# Patient Record
Sex: MALE | Race: WHITE
[De-identification: names, ages, dates, MRNs, and addresses within clinical notes are randomized per-mention and may not be internally consistent; named-entity substitution may affect disease eponyms.]

---

## 2019-04-06 ENCOUNTER — HOSPITAL ENCOUNTER (EMERGENCY)
Dept: HOSPITAL 25 - UCCORT | Age: 54
Discharge: HOME | End: 2019-04-06
Payer: COMMERCIAL

## 2019-04-06 VITALS — SYSTOLIC BLOOD PRESSURE: 103 MMHG | DIASTOLIC BLOOD PRESSURE: 71 MMHG

## 2019-04-06 DIAGNOSIS — J32.9: Primary | ICD-10-CM

## 2019-04-06 PROCEDURE — G0463 HOSPITAL OUTPT CLINIC VISIT: HCPCS

## 2019-04-06 PROCEDURE — 99202 OFFICE O/P NEW SF 15 MIN: CPT

## 2019-04-06 NOTE — UC
Respiratory Complaint HPI





- HPI Summary


HPI Summary: 


Per RN triage: "Sinus congestion and pressure for 1 week. Worsening over the 

last 4 days. Starting to move towards chest. Yellow productive cough. "


+ teeth hurt. increased painw/ beding fwd. has ahd sinus infections  in past 

and sx are consistent. 


-no wheezing. no asthma. 





- History of Current Complaint


Chief Complaint: UCGeneralIllness


Stated Complaint: SOB,SINUS COMPLAINT


Time Seen by Provider: 04/06/19 12:06


Pain Intensity: 4





- Allergies/Home Medications


Allergies/Adverse Reactions: 


 Allergies











Allergy/AdvReac Type Severity Reaction Status Date / Time


 


No Known Allergies Allergy   Verified 04/06/19 11:32











Home Medications: 


 Home Medications





Otc Decongestant 1 dose PO ONCE PRN 04/06/19 [History Confirmed 04/06/19]











PMH/Surg Hx/FS Hx/Imm Hx


Previously Healthy: Yes





- Surgical History


Surgical History: Yes


Surgery Procedure, Year, and Place: L knee





- Social History


Alcohol Use: Occasionally


Substance Use Type: None


Smoking Status (MU): Never Smoked Tobacco


Type: Smokeless Tobacco


Amount Used/How Often: 2 cans/week





Review of Systems


All Other Systems Reviewed And Are Negative: Yes


Constitutional: Positive: Negative


Skin: Positive: Negative


Eyes: Positive: Negative


ENT: Positive: Nasal Discharge, Sinus Pain/Tenderness


Respiratory: Negative: Shortness Of Breath


Cardiovascular: Positive: Negative


Gastrointestinal: Positive: Negative


Genitourinary: Positive: Negative


Motor: Positive: Negative


Neurovascular: Positive: Negative


Musculoskeletal: Positive: Negative


Neurological: Positive: Negative


Psychological: Positive: Negative


Is Patient Immunocompromised?: No





Physical Exam


Triage Information Reviewed: Yes


Appearance: Well-Appearing, No Pain Distress, Well-Nourished - very pleasant


Vital Signs: 


 Initial Vital Signs











Temp  97.4 F   04/06/19 11:33


 


Pulse  63   04/06/19 11:33


 


Resp  16   04/06/19 11:33


 


BP  103/71   04/06/19 11:33


 


Pulse Ox  100   04/06/19 11:33











Vital Signs Reviewed: Yes


Eye Exam: Normal


ENT: Positive: Pharyngeal erythema - + PND, TMs normal, Sinus tenderness - b/l 

maxillary.  Negative: TM bulging, Tonsillar swelling, Tonsillar exudate


Dental Exam: Normal


Neck exam: Normal


Neck: Positive: Supple, Nontender, No Lymphadenopathy


Respiratory Exam: Normal


Respiratory: Positive: Chest non-tender, Lungs clear, Normal breath sounds, No 

respiratory distress, No accessory muscle use.  Negative: Crackles, Rhonchi, 

Stridor, Wheezing


Cardiovascular Exam: Normal


Cardiovascular: Positive: RRR


Abdomen Description: Positive: Nontender, Soft


Neurological Exam: Normal


Psychological Exam: Normal


Skin Exam: Normal





Respiratory Course/Dx





- Differential Dx/Diagnosis


Differential Diagnosis/HQI/PQRI: Asthma, Bronchitis, Sinusitis


Provider Diagnosis: 


 Sinusitis








Discharge





- Sign-Out/Discharge


Documenting (check all that apply): Patient Departure


All imaging exams completed and their final reports reviewed: No Studies





- Discharge Plan


Condition: Stable


Disposition: HOME


Prescriptions: 


Amoxicillin PO (*) [Amoxicillin 875 MG (*)] 875 mg PO BID #20 tab


Patient Education Materials:  Sinusitis (ED)


Referrals: 


No Primary Care Phys,NOPCP [Primary Care Provider] - 


Additional Instructions: 


Make sure to take a probiotic daily while on antibiotics to help prevent a 

potential complication of antibiotic use called c diff. Some well known brands 

that can be found OTC are florastor, align and colon health.  Make sure to 

complete the entire prescription unless advised otherwise by your health care 

provider. 


Please follow up with your primary care in 1-2 wks if your symptoms worsen or 

persist. 





- Billing Disposition and Condition


Condition: STABLE


Disposition: Home

## 2019-04-17 ENCOUNTER — HOSPITAL ENCOUNTER (EMERGENCY)
Dept: HOSPITAL 25 - UCCORT | Age: 54
Discharge: HOME | End: 2019-04-17
Payer: COMMERCIAL

## 2019-04-17 VITALS — SYSTOLIC BLOOD PRESSURE: 114 MMHG | DIASTOLIC BLOOD PRESSURE: 84 MMHG

## 2019-04-17 DIAGNOSIS — R10.9: Primary | ICD-10-CM

## 2019-04-17 PROCEDURE — 99212 OFFICE O/P EST SF 10 MIN: CPT

## 2019-04-17 PROCEDURE — 74176 CT ABD & PELVIS W/O CONTRAST: CPT

## 2019-04-17 PROCEDURE — 81003 URINALYSIS AUTO W/O SCOPE: CPT

## 2019-04-17 PROCEDURE — G0463 HOSPITAL OUTPT CLINIC VISIT: HCPCS

## 2019-04-17 NOTE — UC
Abdominal Pain Male HPI





- HPI Summary


HPI Summary: 


53-year-old male comes in with a chief complaint of left-sided abdominal pain.  

2 weeks ago he had sinusitis and was treated with an antibiotic.  4 days ago he 

started with diarrhea.  The left-sided abdominal pain started 4 days ago.  At 

this time it's about 8 out of 10.  The diarrhea stopped yesterday.  Today he 

had 3 formed bowel movements.  Denies any fevers.  Denies fevers or chills.  

Pain is worse with movement.  Denies any urinary symptoms.  No history of 

kidney stones.  Denies any scrotal testicular or genital pain.  No prior 

abdominal surgeries.





- History of Current Complaint


Chief Complaint: UCAbdominalPain


Stated Complaint: LEFT SIDE PAIN


Time Seen by Provider: 19 08:19


Pain Intensity: 8





- Allergies/Home Medications


Allergies/Adverse Reactions: 


 Allergies











Allergy/AdvReac Type Severity Reaction Status Date / Time


 


No Known Allergies Allergy   Verified 19 11:32











Home Medications: 


 Home Medications





L.acidoph,Paracasei, B.lactis [Probiotic] 1 each PO DAILY 19 [History 

Confirmed 19]











PMH/Surg Hx/FS Hx/Imm Hx


Previously Healthy: Yes





- Surgical History


Surgical History: Yes


Surgery Procedure, Year, and Place: L knee





- Family History


Known Family History: Positive: Non-Contributory





- Social History


Alcohol Use: Occasionally


Substance Use Type: None


Smoking Status (MU): Never Smoked Tobacco


Type: Smokeless Tobacco


Amount Used/How Often: 2 cans/week





Review of Systems


All Other Systems Reviewed And Are Negative: Yes


Constitutional: Positive: Negative


Skin: Positive: Negative


Eyes: Positive: Negative


ENT: Positive: Negative


Respiratory: Positive: Negative


Cardiovascular: Positive: Negative


Gastrointestinal: Positive: Abdominal Pain, Diarrhea.  Negative: Vomiting


Genitourinary: Positive: Negative.  Negative: Dysuria, Vaginal/Penile Tenderness


Motor: Positive: Negative


Neurovascular: Positive: Negative


Musculoskeletal: Positive: Negative


Neurological: Positive: Negative


Psychological: Positive: Negative


Is Patient Immunocompromised?: No





Physical Exam


Triage Information Reviewed: Yes


Appearance: Well-Appearing, Well-Nourished, Pain Distress - MILD


Vital Signs: 


 Initial Vital Signs











Temp  97.3 F   19 08:02


 


Pulse  61   19 08:02


 


Resp  17   19 08:02


 


BP  114/84   19 08:02


 


Pulse Ox  100   19 08:02











Vital Signs Reviewed: Yes


Eye Exam: Normal


Eyes: Positive: Conjunctiva Clear


Neck exam: Normal


Neck: Positive: Supple


Respiratory: Positive: Lungs clear, Normal breath sounds, No respiratory 

distress


Cardiovascular: Positive: RRR


Abdomen Description: Positive: CVA Tenderness (L), Other: - TENDER TO PALPATION 

LLQ.  Negative: CVA Tenderness (R)


Bowel Sounds: Positive: Present


Musculoskeletal Exam: Normal


Musculoskeletal: Positive: Strength Intact, ROM Intact


Neurological Exam: Normal


Neurological: Positive: Alert, Muscle Tone Normal


Psychological Exam: Normal


Psychological: Positive: Age Appropriate Behavior


Skin Exam: Normal





Abd Pain Male Course/Dx





- Course


Course Of Treatment: 





Patient Name: RAMÍREZ OSEGUERA Medical Record#: [id number]


Ordering Physician: Sp Arboleda MD Acct.#: [id number]


: 1965 Age: 53 Sex: M Location: URGENT CARE [facility]


Exam Date: 19 ADM Status: REG ER





Order Information: CT ABD/PEL W/O


Accession Number: [id number]


CPT: [id number]


INDICATION: Left-sided abdominal pain.





COMPARISON: There are no prior studies available for comparison.





TECHNIQUE: A CT scan of the abdomen and pelvis was performed without 

intravenous and


without oral contrast. Contiguous axial sections were obtained from the lung 

bases through


the symphysis pubis. Images were reconstructed in the coronal and sagittal 

planes.





FINDINGS:





LUNG BASES: The lung bases are clear. No pleural effusion is present.





LIVER: The liver is normal in size and density. There are a couple 

calcifications present


within the liver. In addition there is a fluid density lesion in the left 

hepatic lobe


measuring 1.3 x 1.0 cm in size incompletely characterized on this noncontrast 

study


although suggestive of a cyst.





GALLBLADDER: No calcified gallstones are seen.





BILE DUCTS: No intra or extrahepatic ductal distention is seen.





SPLEEN: There are multiple splenic calcifications suggestive of old 

granulomatous disease.





PANCREAS: The pancreas is normal in size. No ductal distention or 

calcifications are seen.





ADRENAL GLANDS: The adrenal glands are normal in size.





KIDNEYS: The kidneys are normal in size. No renal calculi or hydronephrosis is 

seen. There


are bilateral Bochdalek hernias containing retroperitoneal fat.





AORTA: The aorta is normal in caliber without calcific plaque.





LYMPH NODES: No significantly enlarged lymph nodes are seen.





BOWEL: The stomach, small and large bowel appear nondistended. The appendix 

appears to be


within normal limits. There is moderate sigmoid diverticulosis without specific 

evidence


for diverticulitis. The study is somewhat limited without contrast.





PELVIC ORGANS: No bladder wall thickening is seen. The prostate gland is 

moderately


enlarged with coarse internal calcifications.





PERITONEUM: No free intraperitoneal air or fluid is seen.





BONES: There is moderate bilateral osteoarthritic change in the hips. No 

significant focal


osseous abnormality is seen.





IMPRESSION:


1. SLIGHTLY LIMITED STUDY, NO EVIDENCE FOR ACUTE FINDING.


2. FINDINGS CONSISTENT WITH OLD GRANULOMATOUS DISEASE IN THE LIVER AND SPLEEN.


3. SMALL HYPODENSE HEPATIC LESION SUGGESTIVE OF A CYST ALTHOUGH INCOMPLETELY 

CHARACTERIZED


ON THIS NONCONTRAST STUDY.











____________________________________________________________


<Electronically signed by David Smith MD in OV> [id number]








i DISCUSSED THE ct REPORT with the patient and also the urine results.  No 

acute process seen in the CT ordered a urine.  Patient denies any testicular 

pain or groin pain.  No history of back problems.  No respiratory symptoms.  

This pain reminds the patient of his diverticulitis.  The plan at this time is 

to treat for diverticulitis.  Follow-up with primary care doctor.  We discussed 

that since we did not see diverticulitis and were treating for the probability 

of diverticulitis the patient needs to have a low threshold for getting further 

evaluation and treatment in the emergency department if the pain continues or 

if he develops a fever or if he feels worse.





- Differential Dx/Clinical Impression


Provider Diagnosis: 


 Left sided abdominal pain








Discharge





- Sign-Out/Discharge


Documenting (check all that apply): Patient Departure


All imaging exams completed and their final reports reviewed: Yes





- Discharge Plan


Condition: Stable


Disposition: HOME


Prescriptions: 


Ciprofloxacin TAB* [Cipro 500 MG TAB*] 500 mg PO BID #20 tab


metroNIDAZOLE [Flagyl 500 MG TAB] 500 mg PO TID #30 tab


Patient Education Materials:  Acute Abdominal Pain (ED), Diverticulosis (ED)


Referrals: 


[facility] [place] [facility]FERRAL [Outside]


Additional Instructions: 


FOLLOW UP WITH YOUR DOCTOR.


GO TO THE EMERGENCY DEPARTMENT IF YOUR CONDITION WORSENS; PAIN, FEVER, YOU FEEL 

ILL OR ANY QUESTIONS OR CONCERNS.








- Billing Disposition and Condition


Condition: STABLE


Disposition: Home

## 2019-12-26 ENCOUNTER — HOSPITAL ENCOUNTER (EMERGENCY)
Dept: HOSPITAL 25 - UCCORT | Age: 54
Discharge: HOME | End: 2019-12-26
Payer: COMMERCIAL

## 2019-12-26 VITALS — DIASTOLIC BLOOD PRESSURE: 67 MMHG | SYSTOLIC BLOOD PRESSURE: 93 MMHG

## 2019-12-26 DIAGNOSIS — R06.02: ICD-10-CM

## 2019-12-26 DIAGNOSIS — R05: ICD-10-CM

## 2019-12-26 DIAGNOSIS — J11.1: ICD-10-CM

## 2019-12-26 DIAGNOSIS — H61.21: Primary | ICD-10-CM

## 2019-12-26 DIAGNOSIS — R09.81: ICD-10-CM

## 2019-12-26 DIAGNOSIS — R53.83: ICD-10-CM

## 2019-12-26 LAB — FLUAV RNA SPEC QL NAA+PROBE: POSITIVE

## 2019-12-26 PROCEDURE — G0463 HOSPITAL OUTPT CLINIC VISIT: HCPCS

## 2019-12-26 PROCEDURE — 71046 X-RAY EXAM CHEST 2 VIEWS: CPT

## 2019-12-26 PROCEDURE — 99212 OFFICE O/P EST SF 10 MIN: CPT

## 2019-12-26 NOTE — ED
Respiratory





- HPI Summary


HPI Summary: 





54 yr old male with the complaint of coughing, fever, chills.  Onset 3-4 days 

ago.  He has a son who tested positive for influenza.  The patient has fatigue 

and some SOB with exertion since coming down with the fever and the coughing.  

She has felt weak and tired.  He has had myalgias.  He denies NVD.   He has a 

left earache as well. He has no other complaints.  





- History of Current Complaint


Chief Complaint: UCGeneralIllness


Stated Complaint: FLU LIKE SYMPTOMS


Time Seen by Provider: 12/26/19 15:39


Pain Intensity: 4





- Allergy/Home Medications


Allergies/Adverse Reactions: 


 Allergies











Allergy/AdvReac Type Severity Reaction Status Date / Time


 


No Known Allergies Allergy   Verified 12/26/19 15:29











Home Medications: 


 Home Medications





Ascorbic Acid TAB* [Vitamin C  TAB*] 1,000 mg PO DAILY 12/26/19 [History 

Confirmed 12/26/19]


Ibuprofen TAB* [Motrin TAB* 600 MG] 600 mg PO Q6H PRN 12/26/19 [History 

Confirmed 12/26/19]


Oscillococcinum PO TID 12/26/19 [History]











PMH/Surg Hx/FS Hx/Imm Hx


Endocrine/Hematology History: 


   Denies: Hx Diabetes, Hx Thyroid Disease


Cardiovascular History: 


   Denies: Hx Hypertension


Respiratory History: 


   Denies: Hx Asthma, Hx Chronic Obstructive Pulmonary Disease (COPD)


GI History: 


   Denies: Hx Ulcer





- Surgical History


Surgery Procedure, Year, and Place: L knee


Infectious Disease History: Yes


Infectious Disease History: Reports: Hx Shingles - 2 years ago


   Denies: Hx Hepatitis, Hx Human Immunodeficiency Virus (HIV), Traveled 

Outside the [place] in Last 30 Days





- Family History


Known Family History: Positive: None





- Social History


Occupation: Employed Full-time


Lives: With Family


Alcohol Use: Daily


Substance Use Type: Reports: Marijuana


Substance Use Comment - Amount & Last Used: 1-2 times per week


Smoking Status (MU): Never Smoked Tobacco


Type: Smokeless Tobacco


Amount Used/How Often: 2 cans/week





Review of Systems


Constitutional: Negative


Positive: Ear Ache


Positive: Cough


All Other Systems Reviewed And Are Negative: Yes





Physical Exam


Triage Information Reviewed: Yes


Vital Signs On Initial Exam: 


 Initial Vitals











Temp Pulse Resp BP Pulse Ox


 


 97.9 F   71   18   93/67   100 


 


 12/26/19 15:23  12/26/19 15:23  12/26/19 15:23  12/26/19 15:23  12/26/19 15:23











Vital Signs Reviewed: Yes


Appearance: Positive: Well-Appearing, No Pain Distress


Skin: Positive: Warm, Skin Color Reflects Adequate Perfusion


Head/Face: Positive: Normal Head/Face Inspection


Eyes: Positive: EOMI


ENT: Positive: Nasal congestion, TM red - left mild erythema, Other - right TM 

with cerumen impaction


Neck: Positive: Supple


Respiratory/Lung Sounds: Positive: Clear to Auscultation, Breath Sounds Present


Cardiovascular: Positive: RRR.  Negative: Murmur


Abdomen Description: Positive: Nontender


Musculoskeletal: Positive: Strength/ROM Intact


Neurological: Positive: Sensory/Motor Intact, Alert, Oriented to Person Place, 

Time, CN Intact II-III, Normal Gait, Speech Normal


Psychiatric: Positive: Normal





Diagnostics





- Vital Signs


 Vital Signs











  Temp Pulse Resp BP Pulse Ox


 


 12/26/19 15:23  97.9 F  71  18  93/67  100














- Laboratory


Lab Results: 


 Lab Results











  12/26/19 Range/Units





  15:46 


 


Influenza A (Rapid)  Positive A  (Negative)  











Lab Statement: Any lab studies that have been ordered have been reviewed, and 

results considered in the medical decision making process.





- Radiology


  ** pedro jaffe chest


Radiology Interpretation Completed By: Radiologist - nad





Disposition





- Course


Course Of Treatment: 54 yr old with influenza.  Note for work given, and 

tamiflu prescription given. Nurse removed wax by irrigation.  I inspected the 

right ear after and the TM was clear.





- Diagnoses


Provider Diagnoses: 


 Influenza, Impacted cerumen of right ear








Discharge ED





- Sign-Out/Discharge


Documenting (check all that apply): Patient Departure


All imaging exams completed and their final reports reviewed: No Studies





- Discharge Plan


Condition: Good


Disposition: HOME


Prescriptions: 


Oseltamivir CAP* [Tamiflu CAP*] 75 mg PO BID #10 cap


Patient Education Materials:  Influenza (ED)


Forms:  *Work Release


Referrals: 


No Primary Care Phys,NOPCP [Primary Care Provider] - 


[facility] [place] [facility] REFERRAL [Outside] - 2 Days





- Billing Disposition and Condition


Condition: GOOD


Disposition: Home